# Patient Record
Sex: FEMALE | Race: WHITE | NOT HISPANIC OR LATINO
[De-identification: names, ages, dates, MRNs, and addresses within clinical notes are randomized per-mention and may not be internally consistent; named-entity substitution may affect disease eponyms.]

---

## 2017-01-17 ENCOUNTER — APPOINTMENT (OUTPATIENT)
Dept: OTOLARYNGOLOGY | Facility: CLINIC | Age: 27
End: 2017-01-17

## 2017-01-17 ENCOUNTER — LABORATORY RESULT (OUTPATIENT)
Age: 27
End: 2017-01-17

## 2017-01-17 VITALS
TEMPERATURE: 98.4 F | HEART RATE: 81 BPM | SYSTOLIC BLOOD PRESSURE: 104 MMHG | OXYGEN SATURATION: 98 % | DIASTOLIC BLOOD PRESSURE: 73 MMHG

## 2017-01-17 DIAGNOSIS — R49.9 UNSPECIFIED VOICE AND RESONANCE DISORDER: ICD-10-CM

## 2017-01-17 DIAGNOSIS — R49.0 DYSPHONIA: ICD-10-CM

## 2017-01-18 LAB
25(OH)D3 SERPL-MCNC: 18.3 NG/ML
T4 FREE SERPL-MCNC: 1.8 NG/DL
THYROGLOB AB SERPL-ACNC: <20 IU/ML
THYROGLOB SERPL-MCNC: <0.2 NG/ML
TSH SERPL-ACNC: 0.46 UIU/ML

## 2017-07-21 ENCOUNTER — APPOINTMENT (OUTPATIENT)
Dept: OPHTHALMOLOGY | Facility: CLINIC | Age: 27
End: 2017-07-21

## 2017-07-21 DIAGNOSIS — H52.13 MYOPIA, BILATERAL: ICD-10-CM

## 2017-11-10 ENCOUNTER — APPOINTMENT (OUTPATIENT)
Dept: OPHTHALMOLOGY | Facility: CLINIC | Age: 27
End: 2017-11-10

## 2017-11-16 ENCOUNTER — APPOINTMENT (OUTPATIENT)
Dept: OPHTHALMOLOGY | Facility: CLINIC | Age: 27
End: 2017-11-16
Payer: COMMERCIAL

## 2017-11-16 DIAGNOSIS — H04.123 DRY EYE SYNDROME OF BILATERAL LACRIMAL GLANDS: ICD-10-CM

## 2017-11-16 PROCEDURE — 92133 CPTRZD OPH DX IMG PST SGM ON: CPT

## 2017-11-16 PROCEDURE — 92012 INTRM OPH EXAM EST PATIENT: CPT

## 2017-11-16 PROCEDURE — 92083 EXTENDED VISUAL FIELD XM: CPT

## 2018-03-19 ENCOUNTER — LABORATORY RESULT (OUTPATIENT)
Age: 28
End: 2018-03-19

## 2018-03-19 ENCOUNTER — APPOINTMENT (OUTPATIENT)
Dept: OTOLARYNGOLOGY | Facility: CLINIC | Age: 28
End: 2018-03-19
Payer: COMMERCIAL

## 2018-03-19 VITALS
DIASTOLIC BLOOD PRESSURE: 73 MMHG | OXYGEN SATURATION: 97 % | SYSTOLIC BLOOD PRESSURE: 109 MMHG | TEMPERATURE: 98.3 F | HEART RATE: 74 BPM

## 2018-03-19 DIAGNOSIS — J35.8 OTHER CHRONIC DISEASES OF TONSILS AND ADENOIDS: ICD-10-CM

## 2018-03-19 PROCEDURE — 31575 DIAGNOSTIC LARYNGOSCOPY: CPT

## 2018-03-19 PROCEDURE — 99214 OFFICE O/P EST MOD 30 MIN: CPT | Mod: 25

## 2018-03-20 LAB
25(OH)D3 SERPL-MCNC: 22.8 NG/ML
ALBUMIN SERPL ELPH-MCNC: 4.5 G/DL
ALP BLD-CCNC: 35 U/L
ALT SERPL-CCNC: 10 U/L
ANION GAP SERPL CALC-SCNC: 16 MMOL/L
AST SERPL-CCNC: 17 U/L
BASOPHILS # BLD AUTO: 0.03 K/UL
BASOPHILS NFR BLD AUTO: 0.4 %
BILIRUB SERPL-MCNC: 0.2 MG/DL
BUN SERPL-MCNC: 11 MG/DL
CALCIUM SERPL-MCNC: 9.6 MG/DL
CHLORIDE SERPL-SCNC: 101 MMOL/L
CO2 SERPL-SCNC: 24 MMOL/L
CREAT SERPL-MCNC: 0.6 MG/DL
EOSINOPHIL # BLD AUTO: 0.05 K/UL
EOSINOPHIL NFR BLD AUTO: 0.6 %
GLUCOSE SERPL-MCNC: 92 MG/DL
HCT VFR BLD CALC: 36.4 %
HGB BLD-MCNC: 11.7 G/DL
IMM GRANULOCYTES NFR BLD AUTO: 0.3 %
LYMPHOCYTES # BLD AUTO: 3.1 K/UL
LYMPHOCYTES NFR BLD AUTO: 39.2 %
MAN DIFF?: NORMAL
MCHC RBC-ENTMCNC: 29.5 PG
MCHC RBC-ENTMCNC: 32.1 GM/DL
MCV RBC AUTO: 91.7 FL
MONOCYTES # BLD AUTO: 0.55 K/UL
MONOCYTES NFR BLD AUTO: 7 %
NEUTROPHILS # BLD AUTO: 4.16 K/UL
NEUTROPHILS NFR BLD AUTO: 52.5 %
PLATELET # BLD AUTO: 435 K/UL
POTASSIUM SERPL-SCNC: 4.3 MMOL/L
PROT SERPL-MCNC: 7.2 G/DL
RBC # BLD: 3.97 M/UL
RBC # FLD: 13.7 %
SODIUM SERPL-SCNC: 141 MMOL/L
THYROGLOB AB SERPL-ACNC: <20 IU/ML
THYROGLOB SERPL-MCNC: <0.2 NG/ML
WBC # FLD AUTO: 7.91 K/UL

## 2018-03-22 LAB
CALCIUM SERPL-MCNC: 9.6 MG/DL
PARATHYROID HORMONE INTACT: 22 PG/ML
T3FREE SERPL-MCNC: 2.93 PG/ML
T4 FREE SERPL-MCNC: 1.8 NG/DL
TSH SERPL-ACNC: 1.89 UIU/ML

## 2018-07-31 ENCOUNTER — APPOINTMENT (OUTPATIENT)
Dept: OPHTHALMOLOGY | Facility: CLINIC | Age: 28
End: 2018-07-31
Payer: COMMERCIAL

## 2018-07-31 PROCEDURE — 92133 CPTRZD OPH DX IMG PST SGM ON: CPT

## 2018-07-31 PROCEDURE — 92012 INTRM OPH EXAM EST PATIENT: CPT

## 2019-03-26 ENCOUNTER — APPOINTMENT (OUTPATIENT)
Dept: OPHTHALMOLOGY | Facility: CLINIC | Age: 29
End: 2019-03-26
Payer: MEDICAID

## 2019-03-26 DIAGNOSIS — H40.003 PREGLAUCOMA, UNSPECIFIED, BILATERAL: ICD-10-CM

## 2019-03-26 PROCEDURE — 92020 GONIOSCOPY: CPT

## 2019-03-26 PROCEDURE — 92083 EXTENDED VISUAL FIELD XM: CPT

## 2019-03-26 PROCEDURE — 92014 COMPRE OPH EXAM EST PT 1/>: CPT

## 2019-03-26 PROCEDURE — 92133 CPTRZD OPH DX IMG PST SGM ON: CPT

## 2019-04-23 ENCOUNTER — APPOINTMENT (OUTPATIENT)
Dept: OTOLARYNGOLOGY | Facility: CLINIC | Age: 29
End: 2019-04-23
Payer: MEDICAID

## 2019-04-23 VITALS
OXYGEN SATURATION: 96 % | DIASTOLIC BLOOD PRESSURE: 74 MMHG | HEART RATE: 86 BPM | TEMPERATURE: 98.4 F | SYSTOLIC BLOOD PRESSURE: 109 MMHG

## 2019-04-23 PROCEDURE — 76536 US EXAM OF HEAD AND NECK: CPT

## 2019-04-23 PROCEDURE — 99214 OFFICE O/P EST MOD 30 MIN: CPT | Mod: 25

## 2019-04-23 PROCEDURE — 31575 DIAGNOSTIC LARYNGOSCOPY: CPT

## 2019-04-23 NOTE — REASON FOR VISIT
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 [FreeTextEntry1] : PCP is Kiara Sanabria MD,  Dr. Rashaad Mathew is her Endocrinologist.

## 2019-04-23 NOTE — PROCEDURE
[Topical Lidocaine] : topical lidocaine [Image(s) Captured] : image(s) captured and filed [Serial Number: ___] : Serial Number: [unfilled] [Flexible Endoscope] : examined with the flexible endoscope [FreeTextEntry3] : [facility]\par THYROID/NECK ULTRASOUND REPORT\par \par NAME: SUJATA BAI.           MR# [id number]	    ..          : 1990	 ...        DATE: 2019\par \par HISTORY/ INDICATIONS: A 28-year-old female with multifocal papillary thyroid carcinoma status post total thyroidectomy in .  \par \par COMPARISON: None\par \par PROCEDURE: Physician performed high-resolution ultrasound gray scale imaging and color Doppler supplementation of the thyroid gland and neck was obtained in the longitudinal and transverse planes using a 13 MHz linear transducer with image capture.  All measurements are in centimeters (longitudinal x AP x transverse).  \par \par FINDINGS: Overall the thyroid gland is normal in size, hypoechoic and  heterogeneous in echotexture with numerous hypoechoic micronodules and echogenic bands.  Vascularity is minimal on color Doppler flow. \par \par RIGHT LOBE: Is no longer visible. The paratracheal space has been replaced by echogenic scar tissue without vascularity on color Doppler and no sign of nodular disease. No echogenic foci are present to suggest microcalcifications. \par \par ISTHMUS: There is no longer visible.\par \par LEFT LOBE: Is no longer visible. The paratracheal space has been replaced by echogenic scar tissue without vascularity on color Doppler and no sign of nodular disease. No echogenic foci are present to suggest microcalcifications. \par \par PARATHYROID GLANDS: There are no identified enlarged parathyroid glands in the central neck compartment. \par \par LYMPH NODES: There are several benign appearing subcentimeter lymph nodes identified at neck levels II, III and IV bilaterally, all with echogenic hilar lines and a short long axis ratio < 0.5 in the transverse plane.\par \par IMPRESSION: A 28-year-old female with multifocal papillary thyroid carcinoma status post total thyroidectomy in .  There is no current evidence of residual thyroid tissue in the central neck compartment, tumor recurrence, or enlarged/morphologically abnormal-appearing lymph nodes. \par \par RECOMMENDATIONS: Repeat thyroid ultrasound in 1 year as well as continued monitoring of TSH and Thyroglobulin. \par \par Electronically signed by Shelton Araiza MD on 2019, 3:40 PM [de-identified] : The nasal septum is minimally deviated to the right with a spur. There are no masses or polyps and the nasal mucosa and secretions are normal. The choanae and posterior nasopharynx are normal without masses or drainage. The Eustachian tube orifices appear patent. The pharynx, including the posterior and lateral pharyngeal walls, the vallecula and base of tongue are normal without ulcerations, lesions or masses. The hypopharynx including the pyriform sinuses open well without pooling of secretions, mucosal lesions or masses. The supraglottic larynx including the epiglottis, petiole, glossoepiglottic folds and pharyngoepiglottic folds are normal without mucosal lesions, ulcerations or masses. The glottis reveals normal false vocal folds. The true vocal folds are glistening white, tense and of equal length, without paralysis, having symmetric mobility on adduction and abduction. There are no mucosal lesions, nodules, cysts, erythroplasia or leukoplakia. The posterior cricoid area has healthy pink mucosa in the interarytenoid area and esophageal inlet. There is mild thickening/edema of the interarytenoid mucosa suggestive of posterior laryngitis from laryngopharyngeal acid reflux disease. The trachea is clear without narrowing, deviation or lesions. \par  [de-identified] : papillary thyroid carcinoma

## 2019-04-23 NOTE — HISTORY OF PRESENT ILLNESS
[de-identified] : Mukesh is a healthy 24-year-old female who was noted on PE to have a nodule or cyst in her thyroid gland. She was totally asymptomatic and unaware that she had any pathology regarding her thyroid gland.  She is clinically euthyroid.  She denies any recent voice changes, shortness of breath, chest pain, neck pain/ pressure or dysphagia.  There is no family history of thyroid cancer and she has no known radiation exposures.  She has gained 5-6 lbs over 1 year.  She was referred for an ultrasound of her neck on 4/11/15.  This demonstrated a borderline enlarged left thyroid lobe with a complex cyst seen in the anterior superior aspect.  There were no calcifications and the margins were well-defined without significant vascularity.  It measured 1 x 0.5 x 0.5 CM.  The right thyroid lobe was also slightly enlarged.  There is a poorly defined nodule seen in the midportion of the right lobe and contains punctate calcifications.  It measures 1.9 x 1.4 x 1.4 CM and solid.  A FNA biopsy was recommended.  Cervical lymph nodes were normal appearing.\par  [FreeTextEntry1] : Mukesh returns today after a total thyroidectomy for papillary thyroid carcinoma on 08/07/15.  Her voice is back to baseline. She is not taking any calcium supplements.  Her post op calcium was normal.  Her surgical pathology revealed a classic  papillary thyroid carcinoma in the right lobe measuring 1.6 CM and multiple thyroid microcarcinomas including a 6 mm focus in the left lobe and 3 foci in the isthmus and pyramidal lobes measuring 0.1-1.5 mm.  All margins were negative for carcinoma and there were 11 benign lymph nodes identified.  There was evidence of mild chronic lymphocytic thyroiditis. She did not receive MISTRY remnant ablation.  Her current dose of levothyroxine is 112 mcg x 6 days the 1/2 pill 1 day a week. Her weight is stable. Her Tg was <0.1 and neg antibodies in September 2016.  Last year she had an  undetectable Tg and Tg antibodies.  She has not had a thyroid blood panel this year.  She has had an excellent response to treatment with a low risk of recurrence.   Her tonsil stones that she was coughing up weekly is better now with H2O2 gargles as needed.  She has been on Vitamin D but stopped. She denies dysphagia, recent voice changes, new neck masses or neck pain. Her last neck ultrasound on 03/13/18 was CHUN, with no thyroid tissue in the surgical bed or abnormal lymph nodes and stable.  She has pyrosis monthly but only when she over eats.

## 2019-04-23 NOTE — DATA REVIEWED
[de-identified] : See history of present illness [de-identified] : See history of present illness

## 2019-04-23 NOTE — CONSULT LETTER
[Dear  ___] : Dear  [unfilled], [Consult Letter:] : I had the pleasure of evaluating your patient, [unfilled]. [Consult Closing:] : Thank you very much for allowing me to participate in the care of this patient.  If you have any questions, please do not hesitate to contact me. [Please see my note below.] : Please see my note below. [DrYazmin  ___] : Dr. ESQUIVEL [Sincerely,] : Sincerely, [Director, [facility]] : Director, [facility] [Shelton Araiza M.D., FACS, [place]] : Shelton Araiza M.D., FACS, [facility] [Health system] : Health system  [[facility]] : [facility] [Otolaryngology/Head & Neck Surgery] : Otolaryngology/Head & Neck Surgery [] :  [Certified in Neck Ultrasound/ ECNU & AIUM] : Certified in Neck Ultrasound/ ECNU & AIUM [[facility]] : [facility] [FreeTextEntry3] : \par Shelton Araiza M.D., FACS, ECNU\par Director Center for [facility]roid & Parathyroid Surgery\par [facility]\par Certified in Thyroid/Parathyroid/Neck Ultrasound, ECNU/ AIUM\par \par , Department of Otolaryngology\par [facility] at [facility]\par

## 2019-06-14 ENCOUNTER — APPOINTMENT (OUTPATIENT)
Dept: ENDOCRINOLOGY | Facility: CLINIC | Age: 29
End: 2019-06-14
Payer: MEDICAID

## 2019-06-14 VITALS — SYSTOLIC BLOOD PRESSURE: 109 MMHG | HEART RATE: 84 BPM | DIASTOLIC BLOOD PRESSURE: 75 MMHG | WEIGHT: 133 LBS

## 2019-06-14 DIAGNOSIS — E88.81 METABOLIC SYNDROME: ICD-10-CM

## 2019-06-14 PROCEDURE — 99203 OFFICE O/P NEW LOW 30 MIN: CPT

## 2019-06-14 NOTE — PHYSICAL EXAM
[Alert] : alert [No Proptosis] : no proptosis [Normal Voice/Communication] : normal voice communication [Healthy Appearance] : healthy appearance [Normal Hearing] : hearing was normal [No Lid Lag] : no lid lag [No LAD] : no lymphadenopathy [Clear to Auscultation] : lungs were clear to auscultation bilaterally [Normal S1, S2] : normal S1 and S2 [Regular Rhythm] : with a regular rhythm [Normal Mood] : the mood was normal [Normal Affect] : the affect was normal [de-identified] : mild/minimal acanthosis nigricans  [de-identified] : thyroid not palpable

## 2019-06-14 NOTE — ASSESSMENT
[FreeTextEntry1] : Hypothyroid, surgical.  Low risk thyroid cancer, does not need TSH suppression. continue current levothyroxine dose.  Since TSH is normal, weight gain is likely not due to thyroid cause.  Discussed increased thyroxine dose (higher dose) during pregnancy and more frequent monitoring during pregnancy.\par \par may have underlying PCOS?  has oligomenorrhea, mild acanthosis nigricans and weight gain may be related to insulin resistance rather than her thyroid.  But even if she has PCOS, symptoms are mild and don't necessarily need to be treated.  Recommended reducing processed  and simple carbs, increasing exercise (30 min/day), adequate sleep (7h/night) and if unable to lose weight, then would try metformin.\par discussed use of estrogen/progestin pill for contraception and other benefits (lighter periods, scheduled periods, less risk of endometrial hyperplasia).  I think she may have been prescribed a low dose (20mcg EE) estrogen/progestin pill and if she took a higher dose (30-35mcg EE) estrogen/progestin pill, she may not have vaginal dryness.  Pt advised to contact me if she does not get menses in 3 months, so I can give her progestin withdrawal to induce menses.\par RTO 1 year

## 2019-06-14 NOTE — HISTORY OF PRESENT ILLNESS
[Date: ______] : [unfilled] [MISTRY] : patient was not treated with radioactive iodine [FreeTextEntry1] : was seeing a different endo after her thyroidectomy\par has been gaining weight, wonders if it due to thyroid\par last year, was 128 lb and now is 135lb\par she does admit to not eating well (pizza, junk food)\par periods have always been irregular, every 8 weeks  for 15 years\par no acne, hirsutism or hair loss\par tried estrogen/progestin pill in the past, but didn't like it, got vaginal dryness, making intercourse painful\par has rash on arm and legs since she got back from [place] (seeing derm), 2-3 weeks ago. has been seeing derm, using creams, and it is less itchy than before but is resolving very slowly\par \par Meds:\par levothyroxine 112 mcg, 6.5 tab per week [de-identified] : papillary 1.6cm, classical, R lobe.  no extrathyroidal extension.  multiple papillary microcarcinomas (6mm on L, 3 foci of 1-1.5mm on isthmus), negative margins. 0/11 LN [de-identified] : total thyroidectomy, central neck dissection [de-identified] : \par neck sono, 3/18: no evidence of disease  [de-identified] : Tg history: (Tg Ab < 20)\par 1/17: Tg < 0.20\par 3/18: Tg < 0.20\par 3/19: Tg 0.1 (different assay)

## 2019-07-24 ENCOUNTER — APPOINTMENT (OUTPATIENT)
Dept: ENDOCRINOLOGY | Facility: CLINIC | Age: 29
End: 2019-07-24

## 2019-11-07 ENCOUNTER — MEDICATION RENEWAL (OUTPATIENT)
Age: 29
End: 2019-11-07

## 2019-11-10 ENCOUNTER — RX RENEWAL (OUTPATIENT)
Age: 29
End: 2019-11-10

## 2020-07-10 ENCOUNTER — APPOINTMENT (OUTPATIENT)
Dept: ENDOCRINOLOGY | Facility: CLINIC | Age: 30
End: 2020-07-10
Payer: MEDICAID

## 2020-07-10 VITALS — HEIGHT: 61.5 IN | BODY MASS INDEX: 24.91 KG/M2

## 2020-07-10 VITALS — WEIGHT: 134 LBS | DIASTOLIC BLOOD PRESSURE: 77 MMHG | HEART RATE: 96 BPM | SYSTOLIC BLOOD PRESSURE: 111 MMHG

## 2020-07-10 DIAGNOSIS — L65.9 NONSCARRING HAIR LOSS, UNSPECIFIED: ICD-10-CM

## 2020-07-10 PROCEDURE — 99214 OFFICE O/P EST MOD 30 MIN: CPT | Mod: 25

## 2020-07-10 PROCEDURE — 36415 COLL VENOUS BLD VENIPUNCTURE: CPT

## 2020-07-10 NOTE — HISTORY OF PRESENT ILLNESS
[FreeTextEntry1] : saw dermatologist yesterday for hair loss.  losing hair for past 6-9 months.  Derm gave her list of labs that she wants tested.\par no acne, mild hirsutism.  periods still irregular.\par Tried estrogen/progestin pill last year, but got vaginal dryness and intercouse was painful.\par no palpitations or feeling jittery \par diarrhea sometimes, probably diet related.\par not planning pregnancy in the next couple years\par \par Meds:\par levothyroxine 112 mcg, 6.5 tab per week [de-identified] : 8/2015 [MISTRY] : patient was not treated with radioactive iodine [de-identified] : total thyroidectomy, central neck dissection [de-identified] : undetectable Tg since 2017 [de-identified] : \par neck sono, 3/18: no evidence of disease. [de-identified] : papillary 1.6cm, classical, R lobe. no extrathyroidal extension. multiple papillary microcarcinomas (6mm on L, 3 foci of 1-1.5mm on isthmus), negative margins. 0/11 LN

## 2020-07-10 NOTE — PHYSICAL EXAM
[Alert] : alert [Healthy Appearance] : healthy appearance [No Proptosis] : no proptosis [No Lid Lag] : no lid lag [Normal Hearing] : hearing was normal [No LAD] : no lymphadenopathy [Clear to Auscultation] : lungs were clear to auscultation bilaterally [Normal S1, S2] : normal S1 and S2 [Regular Rhythm] : with a regular rhythm [Normal Affect] : the affect was normal [Normal Mood] : the mood was normal [de-identified] : thyroid not palpable [Acanthosis Nigricans] : acanthosis nigricans present [de-identified] : mld acanthosis nigricans

## 2020-07-10 NOTE — ASSESSMENT
[FreeTextEntry1] : Hypothyroid, surgical.  Low risk thyroid cancer, does not need TSH suppression. \par goal TSH 0.4-2.5 range, will adjust dose, if necessary.\par \par Hair loss\par check for hyperandrogenism.  If androgens are elevated, estrogen/progestin pill should improve hair loss.  Asked pt to check which estrogen/progestin pill she was taking, and if she was on low dose pill (20mcg EE), we can try a higher dose (30-35mcg EE) pill.   Also can try spironolactone if she does not want to be on estrogen/progestin pill.  Explained that having infrequent/irregular periods may affect her fertility in the future (may not get pregnant as easily).\par will draw labs for dermatologist.\par BMI almost 25.  REcommended increasing physical activity and losing a little bit of weight (~ 5lb) to be at a healthier BMI.  Avoid sugared drinks and avoid eating at night.\par RTO 6-12 months

## 2020-07-14 LAB
ALBUMIN SERPL ELPH-MCNC: 4.7 G/DL
ALP BLD-CCNC: 53 U/L
ALT SERPL-CCNC: 12 U/L
ANA SER IF-ACNC: NEGATIVE
ANION GAP SERPL CALC-SCNC: 13 MMOL/L
AST SERPL-CCNC: 12 U/L
BILIRUB SERPL-MCNC: 0.2 MG/DL
BUN SERPL-MCNC: 8 MG/DL
CALCIUM SERPL-MCNC: 9.5 MG/DL
CHLORIDE SERPL-SCNC: 102 MMOL/L
CO2 SERPL-SCNC: 24 MMOL/L
CREAT SERPL-MCNC: 0.55 MG/DL
DHEA-S SERPL-MCNC: 173 UG/DL
FERRITIN SERPL-MCNC: 187 NG/ML
FSH SERPL-MCNC: 4.8 IU/L
GLUCOSE SERPL-MCNC: 84 MG/DL
HCT VFR BLD CALC: 37.7 %
HGB BLD-MCNC: 11.8 G/DL
IRON SATN MFR SERPL: 38 %
IRON SERPL-MCNC: 99 UG/DL
LH SERPL-ACNC: 12 IU/L
POTASSIUM SERPL-SCNC: 4.9 MMOL/L
PROLACTIN SERPL-MCNC: 9.4 NG/ML
PROT SERPL-MCNC: 7.1 G/DL
RPR SER-TITR: NORMAL
SODIUM SERPL-SCNC: 139 MMOL/L
TESTOST SERPL-MCNC: 47.9 NG/DL
THYROGLOB AB SERPL-ACNC: <20 IU/ML
THYROGLOB SERPL-MCNC: 0.26 NG/ML
TIBC SERPL-MCNC: 259 UG/DL
TSH SERPL-ACNC: 1.64 UIU/ML
UIBC SERPL-MCNC: 160 UG/DL
VIT B12 SERPL-MCNC: 466 PG/ML

## 2020-07-30 ENCOUNTER — APPOINTMENT (OUTPATIENT)
Dept: OTOLARYNGOLOGY | Facility: CLINIC | Age: 30
End: 2020-07-30
Payer: MEDICAID

## 2020-07-30 VITALS
BODY MASS INDEX: 24.98 KG/M2 | HEIGHT: 61.5 IN | SYSTOLIC BLOOD PRESSURE: 103 MMHG | WEIGHT: 134 LBS | OXYGEN SATURATION: 97 % | TEMPERATURE: 98 F | DIASTOLIC BLOOD PRESSURE: 67 MMHG | HEART RATE: 73 BPM

## 2020-07-30 DIAGNOSIS — E55.9 VITAMIN D DEFICIENCY, UNSPECIFIED: ICD-10-CM

## 2020-07-30 PROCEDURE — 31575 DIAGNOSTIC LARYNGOSCOPY: CPT

## 2020-07-30 PROCEDURE — 99214 OFFICE O/P EST MOD 30 MIN: CPT | Mod: 25

## 2020-07-30 NOTE — REASON FOR VISIT
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 [FreeTextEntry1] : PCP is Kiara Sanabria MD,  Dr. Rashaad Mathew is no longer her Endocrinologist and now see Dr. Verdugo for Insurance issues.

## 2020-07-30 NOTE — CONSULT LETTER
[Dear  ___] : Dear  [unfilled], [Consult Letter:] : I had the pleasure of evaluating your patient, [unfilled]. [Please see my note below.] : Please see my note below. [Consult Closing:] : Thank you very much for allowing me to participate in the care of this patient.  If you have any questions, please do not hesitate to contact me. [Sincerely,] : Sincerely, [DrYazmin  ___] : Dr. ESQUIVEL [Shelton Araiza M.D., FACS, [place]] : Shelton Araiza M.D., FACS, [facility] [Director, [facility]] : Director, [facility] [[facility]] : [facility] [[facility]] : [facility]  [Certified in Neck Ultrasound/ ECNU & AIUM] : Certified in Neck Ultrasound/ ECNU & AIUM [] :  [Otolaryngology/Head & Neck Surgery] : Otolaryngology/Head & Neck Surgery [[facility]] : [facility] [FreeTextEntry3] : \par Shelton Araiza M.D., FACS, ECNU\par Director Center for Thyroid & Parathyroid Surgery\par [facility]\par Certified in Thyroid/Parathyroid/Neck Ultrasound, ECNU/ AIUM\par \par , Department of Otolaryngology\par [facility] at [facility]\par

## 2020-07-30 NOTE — HISTORY OF PRESENT ILLNESS
[de-identified] : Mukesh is a healthy 24-year-old female who was noted on PE to have a nodule or cyst in her thyroid gland. She was totally asymptomatic and unaware that she had any pathology regarding her thyroid gland.  She is clinically euthyroid.  She denies any recent voice changes, shortness of breath, chest pain, neck pain/ pressure or dysphagia.  There is no family history of thyroid cancer and she has no known radiation exposures.  She has gained 5-6 lbs over 1 year.  She was referred for an ultrasound of her neck on 4/11/15.  This demonstrated a borderline enlarged left thyroid lobe with a complex cyst seen in the anterior superior aspect.  There were no calcifications and the margins were well-defined without significant vascularity.  It measured 1 x 0.5 x 0.5 CM.  The right thyroid lobe was also slightly enlarged.  There is a poorly defined nodule seen in the midportion of the right lobe and contains punctate calcifications.  It measures 1.9 x 1.4 x 1.4 CM and solid.  A FNA biopsy was recommended.  Cervical lymph nodes were normal appearing.\par  [FreeTextEntry1] : Mukesh returns today after a total thyroidectomy for papillary thyroid carcinoma on 08/07/15.  Her voice is back to baseline. She is not taking any calcium supplements.  Her post op calcium was normal.  Her surgical pathology revealed a classic  papillary thyroid carcinoma in the right lobe measuring 1.6 CM and multiple thyroid microcarcinomas including a 6 mm focus in the left lobe and 3 foci in the isthmus and pyramidal lobes measuring 0.1-1.5 mm.  All margins were negative for carcinoma and there were 11 benign lymph nodes identified.  There was evidence of mild chronic lymphocytic thyroiditis. She did not receive MISTRY remnant ablation.  Her current dose of levothyroxine is 112 mcg x 6 days the 1/2 pill 1 day a week. Her weight is stable. Her Tg was <0.1 and neg antibodies in September 2016.  She had an  undetectable Tg and Tg antibodies. Her last TSH this month was not suppressed at 1.64.  She has had an excellent response to treatment with a low risk of recurrence.   Her tonsil stones are better now with H2O2 gargles as needed.  She has been on Vitamin D but stopped and remains deficient. She denies dysphagia, recent voice changes, new neck masses or neck pain. Her last neck ultrasound on 04/23/19 was CHUN, with no thyroid tissue in the surgical bed or abnormal lymph nodes and stable.  She has pyrosis but only when she over eats. She is currently being treated for possible PCOS and started Spirolactone. She denies fever, body aches, cough, cyanosis, chest burning, anosmia or recent known COVID exposures.  All family members at home are well.

## 2020-07-30 NOTE — DATA REVIEWED
[de-identified] : See history of present illness [de-identified] : See history of present illness

## 2020-07-30 NOTE — PROCEDURE
[Image(s) Captured] : image(s) captured and filed [Topical Lidocaine] : topical lidocaine [Flexible Endoscope] : examined with the flexible endoscope [Serial Number: ___] : Serial Number: [unfilled] [Oxymetazoline HCl] : oxymetazoline HCl [Unable to Cooperate with Mirror] : patient unable to cooperate with mirror [Gag Reflex] : gag reflex preventing mirror examination [de-identified] : The nasal septum is minimally deviated to the right with a spur. There are no masses or polyps and the nasal mucosa and secretions are normal. The choanae and posterior nasopharynx are normal without masses or drainage. The Eustachian tube orifices appear patent. The pharynx, including the posterior and lateral pharyngeal walls, the vallecula and base of tongue are normal without ulcerations, lesions or masses. The hypopharynx including the pyriform sinuses open well without pooling of secretions, mucosal lesions or masses. The supraglottic larynx including the epiglottis, petiole, glossoepiglottic folds and pharyngoepiglottic folds are normal without mucosal lesions, ulcerations or masses. The glottis reveals normal false vocal folds. The true vocal folds are glistening white, tense and of equal length, without paralysis, having symmetric mobility on adduction and abduction. There are no mucosal lesions, nodules, cysts, erythroplasia or leukoplakia. The posterior cricoid area has healthy pink mucosa in the interarytenoid area and esophageal inlet. There is moderate thickening/edema of the interarytenoid mucosa suggestive of posterior laryngitis from laryngopharyngeal acid reflux disease. The lingual tonsils are prominent. The trachea is clear without narrowing, deviation or lesions. \par  [de-identified] : papillary thyroid carcinoma

## 2020-08-19 ENCOUNTER — RX RENEWAL (OUTPATIENT)
Age: 30
End: 2020-08-19

## 2021-01-15 ENCOUNTER — RX RENEWAL (OUTPATIENT)
Age: 31
End: 2021-01-15

## 2021-03-21 ENCOUNTER — TRANSCRIPTION ENCOUNTER (OUTPATIENT)
Age: 31
End: 2021-03-21

## 2021-05-04 DIAGNOSIS — E04.1 NONTOXIC SINGLE THYROID NODULE: ICD-10-CM

## 2021-06-21 LAB
ANION GAP SERPL CALC-SCNC: 21 MMOL/L
BUN SERPL-MCNC: 9 MG/DL
CALCIUM SERPL-MCNC: 9.9 MG/DL
CHLORIDE SERPL-SCNC: 103 MMOL/L
CO2 SERPL-SCNC: 16 MMOL/L
CREAT SERPL-MCNC: 0.61 MG/DL
GLUCOSE SERPL-MCNC: 84 MG/DL
POTASSIUM SERPL-SCNC: 5.1 MMOL/L
SODIUM SERPL-SCNC: 141 MMOL/L
TESTOST SERPL-MCNC: 64.6 NG/DL
THYROGLOB AB SERPL-ACNC: <20 IU/ML
THYROGLOB SERPL-MCNC: <0.2 NG/ML
TSH SERPL-ACNC: 1.03 UIU/ML

## 2021-06-28 ENCOUNTER — APPOINTMENT (OUTPATIENT)
Dept: ENDOCRINOLOGY | Facility: CLINIC | Age: 31
End: 2021-06-28
Payer: COMMERCIAL

## 2021-06-28 VITALS
BODY MASS INDEX: 25.65 KG/M2 | DIASTOLIC BLOOD PRESSURE: 65 MMHG | SYSTOLIC BLOOD PRESSURE: 100 MMHG | WEIGHT: 138 LBS | HEART RATE: 79 BPM

## 2021-06-28 PROCEDURE — 99215 OFFICE O/P EST HI 40 MIN: CPT

## 2021-06-28 PROCEDURE — 99072 ADDL SUPL MATRL&STAF TM PHE: CPT

## 2021-06-28 NOTE — ASSESSMENT
[FreeTextEntry1] : Hypothyroid, surgical.  Low risk thyroid cancer, does not need TSH suppression. \par TSH in normal range, continue current dose.\par \par PCOS.\par continue spironolactone\par Discussed how insulin potentiates (increases) testosterone levels , so I recommend low carb/low glycemic index diet  and some weight loss  to reduce testosterone level (by lowering insulin levels).  At this point, I don't think she needs metformin since periods are regular and she is not very overweight (I think only a few lb would make a difference for her).\par I don't think she has to take Provera every month since she is getting a period every month, and pelvic sono shows that endometrial lining is normal (not thickened).\par RTO 1 year\par

## 2021-06-28 NOTE — HISTORY OF PRESENT ILLNESS
[FreeTextEntry1] : has new job and new GYN, went for pelvic sono.  Awaiting fax of report during visit\par After taking spironolactone, periods were more regular and lighter.  Menstrual cycles every 3-3.5 weeks\par GYN prescribed her Provera to take every month, but she did not start this.\par hair loss improved\par no palpitations or feeling jittery \par labs from 6/21 reviewed:  high testosterone, normal TSH\par pelvic sono report reviewed.\par \par Meds:\par levothyroxine 112 mcg daily\par spironolactone 50mg [MISTRY] : patient was not treated with radioactive iodine [de-identified] : 8/2015 [de-identified] : total thyroidectomy, central neck dissection [de-identified] : papillary 1.6cm, classical, R lobe. no extrathyroidal extension. multiple papillary microcarcinomas (6mm on L, 3 foci of 1-1.5mm on isthmus), negative margins. 0/11 LN  [de-identified] : \par neck sono, 3/18: no evidence of disease. [de-identified] : undetectable Tg since 2017

## 2021-06-28 NOTE — PHYSICAL EXAM
[Alert] : alert [No Acute Distress] : no acute distress [No Proptosis] : no proptosis [Normal Hearing] : hearing was normal [No Lid Lag] : no lid lag [No LAD] : no lymphadenopathy [Clear to Auscultation] : lungs were clear to auscultation bilaterally [Normal S1, S2] : normal S1 and S2 [Regular Rhythm] : with a regular rhythm [de-identified] : thyroid not palpable

## 2021-06-28 NOTE — DATA REVIEWED
[FreeTextEntry1] : 6/21: TSH 1.03, Tg < 0.20, Tg Ab < 20, tot T 64.6\par 6/20: TSH 1.64, Tg 0.28, Tg Ab < 20, LH/FSH 12/4.8, tot T 47.9, DHEAS 173\par 3/19: TSH 1.53, tot chol 201, HDL 42, , trig 67, A1c 5.5%, Tg 0.1\par \par pelvic sono, 4/21:\par multiple small peripheral ovarian follicles, 15-20 on the R, > 20 on the L\par normal endometrial thickness, 6mm

## 2021-08-10 ENCOUNTER — APPOINTMENT (OUTPATIENT)
Dept: OTOLARYNGOLOGY | Facility: CLINIC | Age: 31
End: 2021-08-10
Payer: COMMERCIAL

## 2021-08-10 VITALS
BODY MASS INDEX: 25.86 KG/M2 | WEIGHT: 137 LBS | HEIGHT: 61 IN | HEART RATE: 105 BPM | DIASTOLIC BLOOD PRESSURE: 78 MMHG | OXYGEN SATURATION: 99 % | SYSTOLIC BLOOD PRESSURE: 120 MMHG | TEMPERATURE: 98.2 F

## 2021-08-10 DIAGNOSIS — E89.0 POSTPROCEDURAL HYPOTHYROIDISM: ICD-10-CM

## 2021-08-10 DIAGNOSIS — J04.0 ACUTE LARYNGITIS: ICD-10-CM

## 2021-08-10 DIAGNOSIS — K21.9 ACUTE LARYNGITIS: ICD-10-CM

## 2021-08-10 PROCEDURE — 31575 DIAGNOSTIC LARYNGOSCOPY: CPT

## 2021-08-10 PROCEDURE — 99214 OFFICE O/P EST MOD 30 MIN: CPT | Mod: 25

## 2021-08-10 NOTE — PROCEDURE
[Image(s) Captured] : image(s) captured and filed [Unable to Cooperate with Mirror] : patient unable to cooperate with mirror [Gag Reflex] : gag reflex preventing mirror examination [Topical Lidocaine] : topical lidocaine [Oxymetazoline HCl] : oxymetazoline HCl [Flexible Endoscope] : examined with the flexible endoscope [Serial Number: ___] : Serial Number: [unfilled] [de-identified] : The nasal septum is minimally deviated to the right with a spur. There are no masses or polyps and the nasal mucosa and secretions are normal. The choanae and posterior nasopharynx are normal without masses or drainage. The Eustachian tube orifices appear patent. The pharynx, including the posterior and lateral pharyngeal walls, the vallecula and base of tongue are normal without ulcerations, lesions or masses. The hypopharynx including the pyriform sinuses open well without pooling of secretions, mucosal lesions or masses. The supraglottic larynx including the epiglottis, petiole, glossoepiglottic folds and pharyngoepiglottic folds are normal without mucosal lesions, ulcerations or masses. The glottis reveals normal false vocal folds. The true vocal folds are glistening white, tense and of equal length, without paralysis, having symmetric mobility on adduction and abduction. There are no mucosal lesions, nodules, cysts, erythroplasia or leukoplakia. The posterior cricoid area has healthy pink mucosa in the interarytenoid area and esophageal inlet. There is mild thickening/edema of the interarytenoid mucosa suggestive of posterior laryngitis from laryngopharyngeal acid reflux disease. The lingual tonsils are prominent. There is a plethora of lymphoid tissue scattered through Waldeyer's ring.  The trachea is clear without narrowing, deviation or lesions. \par  [de-identified] : papillary thyroid carcinoma

## 2021-08-10 NOTE — HISTORY OF PRESENT ILLNESS
[de-identified] : Mukesh is a healthy 24-year-old female who was noted on PE to have a nodule or cyst in her thyroid gland. She was totally asymptomatic and unaware that she had any pathology regarding her thyroid gland.  She is clinically euthyroid.  She denies any recent voice changes, shortness of breath, chest pain, neck pain/ pressure or dysphagia.  There is no family history of thyroid cancer and she has no known radiation exposures.  She has gained 5-6 lbs over 1 year.  She was referred for an ultrasound of her neck on 4/11/15.  This demonstrated a borderline enlarged left thyroid lobe with a complex cyst seen in the anterior superior aspect.  There were no calcifications and the margins were well-defined without significant vascularity.  It measured 1 x 0.5 x 0.5 CM.  The right thyroid lobe was also slightly enlarged.  There is a poorly defined nodule seen in the midportion of the right lobe and contains punctate calcifications.  It measures 1.9 x 1.4 x 1.4 CM and solid.  A FNA biopsy was recommended.  Cervical lymph nodes were normal appearing.\par  [FreeTextEntry1] : Mukesh returns today after a total thyroidectomy for papillary thyroid carcinoma on 08/07/15.  Her voice is back to baseline. She is not taking any calcium supplements.  Her post op calcium was normal.  Her surgical pathology revealed a classic  papillary thyroid carcinoma in the right lobe measuring 1.6 CM and multiple thyroid microcarcinomas including a 6 mm focus in the left lobe and 3 foci in the isthmus and pyramidal lobes measuring 0.1-1.5 mm.  All margins were negative for carcinoma and there were 11 benign lymph nodes identified.  There was evidence of mild chronic lymphocytic thyroiditis. She did not receive MISTRY remnant ablation.  Her current dose of levothyroxine is 112 mcg x 6 days the 1/2 pill 1 day a week. Her weight is stable. Her Tg was <0.20 and neg antibodies in June 2021.  Her last TSH in June in the low normal range at 1.03.  She has had an excellent response to treatment with a low risk of recurrence.   Her tonsil stones are better now with H2O2 gargles as needed.  She has been on Vitamin D3  as she has been deficient. She denies dysphagia, recent voice changes, new neck masses or neck pain. Her last neck ultrasound on 04/23/19 was [facility], with no thyroid tissue in the surgical bed or abnormal lymph nodes and stable.  She has pyrosis but only when she over eats. She is currently being treated for possible PCOS and started Spirolactone. She denies fever, body aches, cough, cyanosis, chest burning, anosmia or recent known COVID exposures.  All family members at home are well.

## 2021-08-10 NOTE — DATA REVIEWED
[de-identified] : See history of present illness [de-identified] : See history of present illness

## 2021-08-10 NOTE — CONSULT LETTER
[Dear  ___] : Dear  [unfilled], [Consult Letter:] : I had the pleasure of evaluating your patient, [unfilled]. [Please see my note below.] : Please see my note below. [Consult Closing:] : Thank you very much for allowing me to participate in the care of this patient.  If you have any questions, please do not hesitate to contact me. [Sincerely,] : Sincerely, [DrYazmin  ___] : Dr. ESQUIVEL [Shelton Araiza M.D., FACS, [facility]] : Shelton Araiza M.D., FACS, [facility] [Director, [facility]] : Director, [facility] [[facility]] : [facility] [[facility]] : [facility]  [Certified in Neck Ultrasound/ ECNU & AIUM] : Certified in Neck Ultrasound/ ECNU & AIUM [] :  [Otolaryngology/Head & Neck Surgery] : Otolaryngology/Head & Neck Surgery [[facility]] : [facility] [FreeTextEntry3] : \par Shelton Araiza M.D., FACS, ECNU\par Director Center for Thy[facility] Parathyroid [facility]\par [facility]\par Certified in Thyroid/Parathyroid/Neck Ultrasound, ECNU/ AIUM\par \par , Department of Otolaryngology\par [facility]\par

## 2021-09-13 ENCOUNTER — RX RENEWAL (OUTPATIENT)
Age: 31
End: 2021-09-13

## 2021-10-29 ENCOUNTER — NON-APPOINTMENT (OUTPATIENT)
Age: 31
End: 2021-10-29

## 2021-10-29 ENCOUNTER — APPOINTMENT (OUTPATIENT)
Dept: OPHTHALMOLOGY | Facility: CLINIC | Age: 31
End: 2021-10-29
Payer: COMMERCIAL

## 2021-10-29 PROCEDURE — 92133 CPTRZD OPH DX IMG PST SGM ON: CPT

## 2021-10-29 PROCEDURE — 92014 COMPRE OPH EXAM EST PT 1/>: CPT

## 2022-02-10 ENCOUNTER — APPOINTMENT (OUTPATIENT)
Dept: OBGYN | Facility: CLINIC | Age: 32
End: 2022-02-10
Payer: COMMERCIAL

## 2022-02-10 VITALS
DIASTOLIC BLOOD PRESSURE: 75 MMHG | BODY MASS INDEX: 26.81 KG/M2 | HEART RATE: 116 BPM | HEIGHT: 61 IN | SYSTOLIC BLOOD PRESSURE: 111 MMHG | WEIGHT: 142 LBS

## 2022-02-10 DIAGNOSIS — Z01.419 ENCOUNTER FOR GYNECOLOGICAL EXAMINATION (GENERAL) (ROUTINE) W/OUT ABNORMAL FINDINGS: ICD-10-CM

## 2022-02-10 DIAGNOSIS — Z87.42 PERSONAL HISTORY OF OTHER DISEASES OF THE FEMALE GENITAL TRACT: ICD-10-CM

## 2022-02-10 DIAGNOSIS — Z11.3 ENCOUNTER FOR SCREENING FOR INFECTIONS WITH A PREDOMINANTLY SEXUAL MODE OF TRANSMISSION: ICD-10-CM

## 2022-02-10 PROCEDURE — 99385 PREV VISIT NEW AGE 18-39: CPT

## 2022-02-10 NOTE — HISTORY OF PRESENT ILLNESS
[Patient declined mammogram] : Patient declined mammogram [Patient declined breast sonogram] : Patient declined breast sonogram [Patient reported PAP Smear was normal] : Patient reported PAP Smear was normal [Patient declined bone density test] : Patient declined bone density test [Patient declined colonoscopy] : Patient declined colonoscopy [Y] : Patient is sexually active [N] : Patient denies prior pregnancies [FreeTextEntry1] : normal last exam;\par + SA-using condoms;\par pt dx with pcos; mneses prev every 2-3 months and now every month\par  [PapSmeardate] : 2020 [LMPDate] : 02/10/2022 [MensesFreq] : 28 [[facility]] : 786 [PGHxTotal] : 0 [PGHxFullTerm] : 0 [PGHxPremature] : 0 [PGHxAbortions] : 0 [[facility]] : [facility] [PGHxABInduced] : 0 [PGHxABSpont] : 0 [PGHxEctopic] : 0 [PGHxMultBirths] : 0

## 2022-02-10 NOTE — PHYSICAL EXAM
[Appropriately responsive] : appropriately responsive [Alert] : alert [No Acute Distress] : no acute distress [No Lymphadenopathy] : no lymphadenopathy [No Murmurs] : no murmurs [Soft] : soft [Non-tender] : non-tender [Non-distended] : non-distended [No HSM] : No HSM [No Lesions] : no lesions [No Mass] : no mass [Oriented x3] : oriented x3 [Chaperone Present] : A chaperone was present in the examining room during all aspects of the physical examination [Examination Of The Breasts] : a normal appearance [No Masses] : no breast masses were palpable [Labia Majora] : normal [Labia Minora] : normal [Normal] : normal [Uterine Adnexae] : normal [FreeTextEntry4] : + drk blood

## 2022-03-01 ENCOUNTER — TRANSCRIPTION ENCOUNTER (OUTPATIENT)
Age: 32
End: 2022-03-01

## 2022-03-01 ENCOUNTER — RX RENEWAL (OUTPATIENT)
Age: 32
End: 2022-03-01

## 2022-03-01 LAB
C TRACH RRNA SPEC QL NAA+PROBE: NOT DETECTED
CYTOLOGY CVX/VAG DOC THIN PREP: ABNORMAL
HPV HIGH+LOW RISK DNA PNL CVX: NOT DETECTED
N GONORRHOEA RRNA SPEC QL NAA+PROBE: NOT DETECTED
SOURCE TP AMPLIFICATION: NORMAL

## 2022-04-11 PROBLEM — J04.0 REFLUX LARYNGITIS: Status: ACTIVE | Noted: 2018-03-19

## 2022-05-24 ENCOUNTER — RX RENEWAL (OUTPATIENT)
Age: 32
End: 2022-05-24

## 2022-07-15 ENCOUNTER — RX RENEWAL (OUTPATIENT)
Age: 32
End: 2022-07-15

## 2022-07-19 PROBLEM — H40.003 GLAUCOMA SUSPECT OF BOTH EYES: Status: ACTIVE | Noted: 2017-07-21

## 2022-09-12 ENCOUNTER — RX RENEWAL (OUTPATIENT)
Age: 32
End: 2022-09-12

## 2022-10-17 ENCOUNTER — APPOINTMENT (OUTPATIENT)
Dept: ENDOCRINOLOGY | Facility: CLINIC | Age: 32
End: 2022-10-17

## 2022-10-17 VITALS
BODY MASS INDEX: 25.89 KG/M2 | WEIGHT: 137 LBS | SYSTOLIC BLOOD PRESSURE: 100 MMHG | DIASTOLIC BLOOD PRESSURE: 73 MMHG | HEART RATE: 90 BPM

## 2022-10-17 PROCEDURE — 99214 OFFICE O/P EST MOD 30 MIN: CPT

## 2022-10-17 NOTE — PHYSICAL EXAM
[Alert] : alert [No Acute Distress] : no acute distress [No Proptosis] : no proptosis [No Lid Lag] : no lid lag [Normal Hearing] : hearing was normal [No LAD] : no lymphadenopathy [Clear to Auscultation] : lungs were clear to auscultation bilaterally [Normal S1, S2] : normal S1 and S2 [Regular Rhythm] : with a regular rhythm [Normal Affect] : the affect was normal [Normal Mood] : the mood was normal [de-identified] : thyroid not palpable

## 2022-10-17 NOTE — DATA REVIEWED
[FreeTextEntry1] : 2/22 TSH 1.72, LH 2.9, FSH 4.8, A1c 5.5%, tot chol 208, trig 147, HDL 37, \par 6/21: TSH 1.03, Tg < 0.20, Tg Ab < 20, tot T 64.6\par 6/20: TSH 1.64, Tg 0.28, Tg Ab < 20, LH/FSH 12/4.8, tot T 47.9, DHEAS 173\par 3/19: TSH 1.53, tot chol 201, HDL 42, , trig 67, A1c 5.5%, Tg 0.1\par \par pelvic sono, 4/21:\par multiple small peripheral ovarian follicles, 15-20 on the R, > 20 on the L\par normal endometrial thickness, 6mm

## 2022-10-17 NOTE — HISTORY OF PRESENT ILLNESS
[FreeTextEntry1] : still losing hair but periods are fairly regular (monthly)\par not planning pregnancy in the near future\par no acne or hirsutism\par declines flu vaccine\par labs reviewed from 2/22 from her phone: TSH normal\par \par Meds:\par levothyroxine 112 mcg daily\par spironolactone 50mg [MISTRY] : patient was not treated with radioactive iodine [de-identified] : 8/2015 [de-identified] : total thyroidectomy, central neck dissection [de-identified] : papillary 1.6cm, classical, R lobe. no extrathyroidal extension. multiple papillary microcarcinomas (6mm on L, 3 foci of 1-1.5mm on isthmus), negative margins. 0/11 LN  [de-identified] : \par neck sono, 3/18: no evidence of disease. [de-identified] : undetectable Tg since 2017

## 2022-10-17 NOTE — ASSESSMENT
[FreeTextEntry1] : Hypothyroid, surgical.  Low risk thyroid cancer, does not need TSH suppression. \par TSH in normal range, continue current dose.\par \par PCOS.\par continue spironolactone\par recommended eating low carb diet (limit high carb foods, avoid sugared drinks) and exercise goal 30-45 min/day.\par I recommended discontinuing spironolactone for 3 months prior to planning pregnancy.  recommend planning pregnancy before age 35, is possible.  Increased risk of diabetes during pregnancy with PCOS diagnosis discussed.\par [facility] form given to check TSH, thyroglobulin, LH/FSH and testosterone (mid cycle) given.\par RTO 1 year

## 2022-10-20 ENCOUNTER — RX RENEWAL (OUTPATIENT)
Age: 32
End: 2022-10-20

## 2022-10-26 ENCOUNTER — APPOINTMENT (OUTPATIENT)
Dept: ENDOCRINOLOGY | Facility: CLINIC | Age: 32
End: 2022-10-26

## 2023-01-10 ENCOUNTER — TRANSCRIPTION ENCOUNTER (OUTPATIENT)
Age: 33
End: 2023-01-10

## 2023-02-06 ENCOUNTER — TRANSCRIPTION ENCOUNTER (OUTPATIENT)
Age: 33
End: 2023-02-06

## 2023-02-08 ENCOUNTER — TRANSCRIPTION ENCOUNTER (OUTPATIENT)
Age: 33
End: 2023-02-08

## 2023-08-25 ENCOUNTER — NON-APPOINTMENT (OUTPATIENT)
Age: 33
End: 2023-08-25

## 2023-08-25 ENCOUNTER — APPOINTMENT (OUTPATIENT)
Dept: OTOLARYNGOLOGY | Facility: CLINIC | Age: 33
End: 2023-08-25
Payer: MEDICAID

## 2023-08-25 VITALS
BODY MASS INDEX: 27.38 KG/M2 | TEMPERATURE: 97.6 F | OXYGEN SATURATION: 99 % | DIASTOLIC BLOOD PRESSURE: 72 MMHG | HEIGHT: 61 IN | SYSTOLIC BLOOD PRESSURE: 108 MMHG | HEART RATE: 83 BPM | WEIGHT: 145 LBS

## 2023-08-25 DIAGNOSIS — J34.2 DEVIATED NASAL SEPTUM: ICD-10-CM

## 2023-08-25 DIAGNOSIS — K21.9 GASTRO-ESOPHAGEAL REFLUX DISEASE W/OUT ESOPHAGITIS: ICD-10-CM

## 2023-08-25 DIAGNOSIS — J30.9 ALLERGIC RHINITIS, UNSPECIFIED: ICD-10-CM

## 2023-08-25 PROCEDURE — 31231 NASAL ENDOSCOPY DX: CPT

## 2023-08-25 PROCEDURE — 99214 OFFICE O/P EST MOD 30 MIN: CPT | Mod: 25

## 2023-08-25 RX ORDER — FAMOTIDINE 20 MG/1
20 TABLET, FILM COATED ORAL
Qty: 90 | Refills: 0 | Status: ACTIVE | COMMUNITY
Start: 2023-08-25 | End: 1900-01-01

## 2023-08-25 RX ORDER — FLUTICASONE PROPIONATE 50 UG/1
50 SPRAY, METERED NASAL TWICE DAILY
Qty: 1 | Refills: 3 | Status: ACTIVE | COMMUNITY
Start: 2023-08-25 | End: 1900-01-01

## 2023-08-26 NOTE — HISTORY OF PRESENT ILLNESS
[de-identified] : CC: AR, LPR  HISTORY OF PRESENT ILLNESS: Ms. Thomas is a pleasant 32 year old lady with AR LPR hx of TT by Dr. Araiza for PTC  known AR as she has a cat and now has more nasal congestion, she takes allegra PRN noted to have some LPR previously as well had some cough 1/2023 that resolved (non-covid) mostly, but still have some throat irritation and cough at night  REVIEW OF SYSTEMS:  General ROS: negative for - chills, fatigue or fever Psychological ROS: negative for - anxiety or depression Ophthalmic ROS: negative for - blurry vision, decreased vision or double vision  ENT ROS: negative except as noted from HPI Allergy and Immunology ROS: negative except as noted from HPI Hematological and Lymphatic ROS: negative for - bleeding problems  Endocrine ROS: negative for - malaise/lethargy Respiratory ROS: negative for - stridor Cardiovascular ROS: negative for - chest pain Gastrointestinal ROS: negative for - appetite loss or nausea/vomiting Genitourinary ROS: negative for - incontinence Musculoskeletal ROS: negative for - gait disturbance  Neurological ROS: negative for - behavioral changes Dermatological ROS: negative for - nail changes  Physical Exam:  GENERAL APPEARANCE: Well-developed and No Acute Distress. COMMUNICATION: Able to Communicate. Strong Voice.  HEAD AND FACE Eyes: Testing of ocular motility including primary gaze alignment normal. Inspection and Appearance: No evidence of lesions or masses Palpation: Palpation of the face reveals no sinus tenderness Salivary Glands: Symmetric without masses Facial Strength: Symmetric without evidence of facial paralysis  EAR, NOSE, MOUTH, and THROAT: Ear Canals and Tympanic Membranes, Bilateral: No evidence of inflammation or lesions. Thresholds: Clinical speech reception thresholds normal. External, Nose and Auricle: No lesions or masses.  NECK: Evaluation: No evidence of masses or crepitus. The neck is symmetric and the trachea is in the midline position. Thyroid: No evidence of enlargement, tenderness or mass. Neck Lymph Nodes: WNL. Respiratory: Inspection of the chest including symmetry, expansion and/or assessment of respiratory effort normal. Cardiovascular: Evaluation of peripheral vascular system by observation and palpation of capillary refill, normal. Neurological/Psychiatric: Alert, Oriented, Mood, and Affect Normal.  PROCEDURE: Nasal endoscopy (34489)   SURGEON: Aaron Alba MD   Prior to the procedure, I had a discussion with the patient regarding the risks, benefits, and alternatives of the procedure and a verbal consent was obtained.   After obtaining adequate decongestion of the nasal mucosa with topical Epinephrine and adequate anesthesia with topical Lidocaine nasal spray, the nasal endoscope was used to examine the nasal passages and paranasal sinuses. The endoscope was passed along the floor of the nose bilaterally to evaluate the inferior meatus, floor of the nose, inferior turbinate, and nasopharynx. The scope was then passed superiorly to evaluate the area of the sphenoethmoidal recess, superior turbinate and superior meatus. As the scope was withdrawn anteriorly, the middle turbinate and middle meatus were carefully inspected. The endoscope was withdrawn and the patient tolerated the procedure well. No complications were encountered.   INSTRUMENTS: flex   EXAM FINDINGS: slgiht DNS, significant cobble stonning clear mucus throughout, PND, some erythema of the arytenoids  IMPRESSION:  is a pleasant 32 year old lady with hx of PTC s/p TT, now with AR, LPR  PLAN: -allegra/flonase/astelin -pepcid/diet modififcations -RTC PRN  Aaron Alba MD [facility] Rhinology and Skull Base Surgery Department of Otolaryngology- Head and Neck Surgery [facility]

## 2023-10-23 ENCOUNTER — RX RENEWAL (OUTPATIENT)
Age: 33
End: 2023-10-23

## 2023-11-29 DIAGNOSIS — K21.9 GASTRO-ESOPHAGEAL REFLUX DISEASE W/OUT ESOPHAGITIS: ICD-10-CM

## 2023-11-29 RX ORDER — PREDNISONE 10 MG/1
10 TABLET ORAL
Qty: 30 | Refills: 0 | Status: ACTIVE | COMMUNITY
Start: 2023-11-29 | End: 1900-01-01

## 2024-01-22 ENCOUNTER — RX RENEWAL (OUTPATIENT)
Age: 34
End: 2024-01-22

## 2024-03-08 ENCOUNTER — RX RENEWAL (OUTPATIENT)
Age: 34
End: 2024-03-08

## 2024-03-08 DIAGNOSIS — C73 MALIGNANT NEOPLASM OF THYROID GLAND: ICD-10-CM

## 2024-03-08 DIAGNOSIS — E89.0 POSTPROCEDURAL HYPOTHYROIDISM: ICD-10-CM

## 2024-03-08 RX ORDER — LEVOTHYROXINE SODIUM 0.11 MG/1
112 TABLET ORAL
Qty: 90 | Refills: 0 | Status: ACTIVE | COMMUNITY
Start: 2017-01-17 | End: 1900-01-01

## 2024-06-05 ENCOUNTER — RX RENEWAL (OUTPATIENT)
Age: 34
End: 2024-06-05

## 2024-06-05 RX ORDER — LEVOTHYROXINE SODIUM 0.11 MG/1
112 TABLET ORAL
Qty: 90 | Refills: 0 | Status: ACTIVE | COMMUNITY
Start: 2024-03-08 | End: 1900-01-01

## 2024-08-20 ENCOUNTER — APPOINTMENT (OUTPATIENT)
Dept: ENDOCRINOLOGY | Facility: CLINIC | Age: 34
End: 2024-08-20

## 2024-09-03 ENCOUNTER — RX RENEWAL (OUTPATIENT)
Age: 34
End: 2024-09-03

## 2024-09-24 ENCOUNTER — TRANSCRIPTION ENCOUNTER (OUTPATIENT)
Age: 34
End: 2024-09-24

## 2024-09-25 ENCOUNTER — TRANSCRIPTION ENCOUNTER (OUTPATIENT)
Age: 34
End: 2024-09-25

## 2024-09-26 ENCOUNTER — TRANSCRIPTION ENCOUNTER (OUTPATIENT)
Age: 34
End: 2024-09-26

## 2024-09-27 ENCOUNTER — LABORATORY RESULT (OUTPATIENT)
Age: 34
End: 2024-09-27

## 2024-09-30 LAB
TESTOST SERPL-MCNC: 14.9 NG/DL
THYROGLOB AB SERPL-ACNC: 18 IU/ML
THYROGLOB SERPL-MCNC: <0.1 NG/ML
TSH SERPL-ACNC: 26.2 UIU/ML

## 2024-10-09 ENCOUNTER — NON-APPOINTMENT (OUTPATIENT)
Age: 34
End: 2024-10-09

## 2024-10-09 ENCOUNTER — APPOINTMENT (OUTPATIENT)
Dept: ENDOCRINOLOGY | Facility: CLINIC | Age: 34
End: 2024-10-09
Payer: COMMERCIAL

## 2024-10-09 VITALS
HEART RATE: 80 BPM | WEIGHT: 146 LBS | DIASTOLIC BLOOD PRESSURE: 60 MMHG | HEIGHT: 61 IN | BODY MASS INDEX: 27.56 KG/M2 | SYSTOLIC BLOOD PRESSURE: 97 MMHG

## 2024-10-09 PROCEDURE — 99214 OFFICE O/P EST MOD 30 MIN: CPT

## 2024-10-09 PROCEDURE — G2211 COMPLEX E/M VISIT ADD ON: CPT | Mod: NC
